# Patient Record
Sex: MALE | NOT HISPANIC OR LATINO | Employment: UNEMPLOYED | URBAN - METROPOLITAN AREA
[De-identification: names, ages, dates, MRNs, and addresses within clinical notes are randomized per-mention and may not be internally consistent; named-entity substitution may affect disease eponyms.]

---

## 2020-06-18 ENCOUNTER — OFFICE VISIT (OUTPATIENT)
Dept: FAMILY MEDICINE | Facility: CLINIC | Age: 25
End: 2020-06-18
Payer: COMMERCIAL

## 2020-06-18 VITALS
SYSTOLIC BLOOD PRESSURE: 119 MMHG | RESPIRATION RATE: 16 BRPM | OXYGEN SATURATION: 98 % | WEIGHT: 183 LBS | TEMPERATURE: 98.2 F | HEART RATE: 72 BPM | BODY MASS INDEX: 27.11 KG/M2 | DIASTOLIC BLOOD PRESSURE: 74 MMHG | HEIGHT: 69 IN

## 2020-06-18 DIAGNOSIS — Z00.00 ROUTINE HISTORY AND PHYSICAL EXAMINATION OF ADULT: Primary | ICD-10-CM

## 2020-06-18 DIAGNOSIS — Z20.2 POTENTIAL EXPOSURE TO STD: ICD-10-CM

## 2020-06-18 DIAGNOSIS — F19.10 SUBSTANCE ABUSE (H): ICD-10-CM

## 2020-06-18 DIAGNOSIS — M25.522 LEFT ELBOW PAIN: ICD-10-CM

## 2020-06-18 LAB
ANION GAP SERPL CALCULATED.3IONS-SCNC: 6 MMOL/L (ref 3–14)
BASOPHILS # BLD AUTO: 0.1 10E9/L (ref 0–0.2)
BASOPHILS NFR BLD AUTO: 1.2 %
BUN SERPL-MCNC: 12 MG/DL (ref 7–30)
CALCIUM SERPL-MCNC: 9.2 MG/DL (ref 8.5–10.1)
CHLORIDE SERPL-SCNC: 111 MMOL/L (ref 94–109)
CHOLEST SERPL-MCNC: 113 MG/DL
CO2 SERPL-SCNC: 24 MMOL/L (ref 20–32)
CREAT SERPL-MCNC: 0.83 MG/DL (ref 0.66–1.25)
DIFFERENTIAL METHOD BLD: NORMAL
EOSINOPHIL # BLD AUTO: 0.5 10E9/L (ref 0–0.7)
EOSINOPHIL NFR BLD AUTO: 9.6 %
ERYTHROCYTE [DISTWIDTH] IN BLOOD BY AUTOMATED COUNT: 12.6 % (ref 10–15)
GFR SERPL CREATININE-BSD FRML MDRD: >90 ML/MIN/{1.73_M2}
GLUCOSE SERPL-MCNC: 82 MG/DL (ref 70–99)
HCT VFR BLD AUTO: 42.3 % (ref 40–53)
HDLC SERPL-MCNC: 51 MG/DL
HGB BLD-MCNC: 13.4 G/DL (ref 13.3–17.7)
IMM GRANULOCYTES # BLD: 0 10E9/L (ref 0–0.4)
IMM GRANULOCYTES NFR BLD: 0.2 %
LDLC SERPL CALC-MCNC: 55 MG/DL
LYMPHOCYTES # BLD AUTO: 1.5 10E9/L (ref 0.8–5.3)
LYMPHOCYTES NFR BLD AUTO: 30.4 %
MCH RBC QN AUTO: 29.9 PG (ref 26.5–33)
MCHC RBC AUTO-ENTMCNC: 31.7 G/DL (ref 31.5–36.5)
MCV RBC AUTO: 94 FL (ref 78–100)
MONOCYTES # BLD AUTO: 0.9 10E9/L (ref 0–1.3)
MONOCYTES NFR BLD AUTO: 17.7 %
NEUTROPHILS # BLD AUTO: 2 10E9/L (ref 1.6–8.3)
NEUTROPHILS NFR BLD AUTO: 40.9 %
NONHDLC SERPL-MCNC: 62 MG/DL
NRBC # BLD AUTO: 0 10*3/UL
NRBC BLD AUTO-RTO: 0 /100
PLATELET # BLD AUTO: 261 10E9/L (ref 150–450)
POTASSIUM SERPL-SCNC: 4.1 MMOL/L (ref 3.4–5.3)
RBC # BLD AUTO: 4.48 10E12/L (ref 4.4–5.9)
SODIUM SERPL-SCNC: 141 MMOL/L (ref 133–144)
TRIGL SERPL-MCNC: 32 MG/DL
WBC # BLD AUTO: 4.8 10E9/L (ref 4–11)

## 2020-06-18 ASSESSMENT — ANXIETY QUESTIONNAIRES
IF YOU CHECKED OFF ANY PROBLEMS ON THIS QUESTIONNAIRE, HOW DIFFICULT HAVE THESE PROBLEMS MADE IT FOR YOU TO DO YOUR WORK, TAKE CARE OF THINGS AT HOME, OR GET ALONG WITH OTHER PEOPLE: NOT DIFFICULT AT ALL
1. FEELING NERVOUS, ANXIOUS, OR ON EDGE: MORE THAN HALF THE DAYS
3. WORRYING TOO MUCH ABOUT DIFFERENT THINGS: SEVERAL DAYS
6. BECOMING EASILY ANNOYED OR IRRITABLE: MORE THAN HALF THE DAYS
7. FEELING AFRAID AS IF SOMETHING AWFUL MIGHT HAPPEN: NOT AT ALL
GAD7 TOTAL SCORE: 8
5. BEING SO RESTLESS THAT IT IS HARD TO SIT STILL: SEVERAL DAYS
2. NOT BEING ABLE TO STOP OR CONTROL WORRYING: SEVERAL DAYS

## 2020-06-18 ASSESSMENT — PATIENT HEALTH QUESTIONNAIRE - PHQ9
SUM OF ALL RESPONSES TO PHQ QUESTIONS 1-9: 5
5. POOR APPETITE OR OVEREATING: SEVERAL DAYS

## 2020-06-18 ASSESSMENT — MIFFLIN-ST. JEOR: SCORE: 1813.63
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
## 2020-06-18 NOTE — NURSING NOTE
"24 year old  Chief Complaint   Patient presents with     Physical     Pt. presents to the clinic today from  [facility] for a physical exam.        Blood pressure 119/74, pulse 72, temperature 98.2  F (36.8  C), temperature source Oral, resp. rate 16, height 1.758 m (5' 9.2\"), weight 83 kg (183 lb), SpO2 98 %. Body mass index is 26.87 kg/m .  BP completed using cuff size:    There is no problem list on file for this patient.      Wt Readings from Last 2 Encounters:   06/18/20 83 kg (183 lb)     BP Readings from Last 3 Encounters:   06/18/20 119/74       No Known Allergies    No current outpatient medications on file.     No current facility-administered medications for this visit.        Social History     Tobacco Use     Smoking status: Current Every Day Smoker     Years: 9.00     Types: Cigarettes     Smokeless tobacco: Never Used   Substance Use Topics     Alcohol use: Not Currently     Drug use: Not Currently     Types: Marijuana, Opiates       Honoring Choices - Health Care Directive Guide offered to patient at time of visit.    Health Maintenance Due   Topic Date Due     PREVENTIVE CARE VISIT  1995     DTAP/TDAP/TD IMMUNIZATION (1 - Tdap) 06/19/2002     HPV IMMUNIZATION (1 - Male 2-dose series) 06/19/2006     HIV SCREENING  06/19/2010     PHQ-2  01/01/2020         There is no immunization history on file for this patient.    No results found for: PAP      No lab results found.    PHQ-2 ( 1999 Pfizer) 6/18/2020   Q1: Little interest or pleasure in doing things 1   Q2: Feeling down, depressed or hopeless 1   PHQ-2 Score 2       PHQ-9 SCORE 6/18/2020   PHQ-9 Total Score 5       LUZMA-7 SCORE 6/18/2020   Total Score 8       No flowsheet data found.        Regla Ahmadi CMA  June 18, 2020 8:22 AM    "

## 2020-06-19 LAB
C TRACH DNA SPEC QL NAA+PROBE: NEGATIVE
HCV AB SERPL QL IA: NONREACTIVE
HIV 1+2 AB+HIV1 P24 AG SERPL QL IA: NONREACTIVE
N GONORRHOEA DNA SPEC QL NAA+PROBE: NEGATIVE
SPECIMEN SOURCE: NORMAL
SPECIMEN SOURCE: NORMAL
T PALLIDUM AB SER QL: NONREACTIVE

## 2020-06-19 ASSESSMENT — ANXIETY QUESTIONNAIRES: GAD7 TOTAL SCORE: 8

## 2020-06-25 ENCOUNTER — VIRTUAL VISIT (OUTPATIENT)
Dept: FAMILY MEDICINE | Facility: CLINIC | Age: 25
End: 2020-06-25
Payer: COMMERCIAL

## 2020-06-25 DIAGNOSIS — Z09 FOLLOW UP: Primary | ICD-10-CM

## 2020-06-25 DIAGNOSIS — F51.02 ADJUSTMENT INSOMNIA: ICD-10-CM

## 2020-06-25 NOTE — PROGRESS NOTES
"Gabriela Sabillon is a 25 year old male who is being evaluated via a billable telephone visit.      The patient has been notified of following:     \"This telephone visit will be conducted via a call between you and your physician/provider. We have found that certain health care needs can be provided without the need for a physical exam.  This service lets us provide the care you need with a short phone conversation.  If a prescription is necessary we can send it directly to your pharmacy.  If lab work is needed we can place an order for that and you can then stop by our lab to have the test done at a later time.    Telephone visits are billed at different rates depending on your insurance coverage. During this emergency period, for some insurers they may be billed the same as an in-person visit.  Please reach out to your insurance provider with any questions.    If during the course of the call the physician/provider feels a telephone visit is not appropriate, you will not be charged for this service.\"    Patient has given verbal consent for Telephone visit?  Yes    What phone number would you like to be contacted at? [phone]    How would you like to obtain your AVS? Mail a copy    Subjective     Gabriela Sabillon is a 25 year old male who presents via phone visit today for the following health issues:    Sami is on the call to discuss his labs from his health maintenance exam.   He also states he is having trouble sleeping and believes it is associated with his anxiety.  He would like to wait for one more week to see if he can adjust to his surroundings and sleep better.    Review of Systems   CONSTITUTIONAL: NEGATIVE for fever, chills, change in weight  ENT/MOUTH: NEGATIVE for ear, mouth and throat problems  RESP: NEGATIVE for significant cough or SOB  CV: NEGATIVE for chest pain, palpitations or peripheral edema     Objective   Reported vitals:  There were no vitals taken for this visit.   PSYCH: Alert and oriented " times 3; coherent speech, normal   rate and volume, able to articulate logical thoughts, able   to abstract reason, no tangential thoughts, no hallucinations   or delusions  His affect is normal  RESP: No cough, no audible wheezing, able to talk in full sentences  Remainder of exam unable to be completed due to telephone visits    Labs reviewed in Epic        Assessment/Plan:  1. Follow up labs and PE exam      2. Adjustment insomnia        No follow-ups on file.      Phone call duration:  6 minutes    Komal Ramos NP

## 2020-08-13 ENCOUNTER — VIRTUAL VISIT (OUTPATIENT)
Dept: FAMILY MEDICINE | Facility: CLINIC | Age: 25
End: 2020-08-13
Payer: COMMERCIAL

## 2020-08-13 DIAGNOSIS — G47.00 PERSISTENT INSOMNIA: Primary | ICD-10-CM

## 2020-08-13 DIAGNOSIS — N50.811 TESTICULAR PAIN, RIGHT: ICD-10-CM

## 2020-08-13 RX ORDER — TRAZODONE HYDROCHLORIDE 50 MG/1
50 TABLET, FILM COATED ORAL
Qty: 30 TABLET | Refills: 1 | Status: SHIPPED | OUTPATIENT
Start: 2020-08-13 | End: 2023-07-09
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
## 2020-08-19 NOTE — TELEPHONE ENCOUNTER
MEDICAL RECORDS REQUEST   [facility]  Urology Clinic  [address] [place] [address]  PHONE: [phone]  Fax: [phone]        FUTURE VISIT INFORMATION                                                   RAYMON Engel: 1995 scheduled for future visit at [facility]    APPOINTMENT INFORMATION:    Date: 20 8:30AM    Provider:  Lakisha Lincoln PA-C    Reason for Visit/Diagnosis: Testicular pain     REFERRAL INFORMATION:    Referring provider: EMILIANO MILAN      Specialty: NP    Referring providers clinic:      Clinic contact number:  [phone]    RECORDS REQUESTED FOR VISIT                                                     NOTES  STATUS/DETAILS   OFFICE NOTE from referring provider  yes   OFFICE NOTE from other specialist  no   DISCHARGE SUMMARY from hospital  no   DISCHARGE REPORT from the ER  no   OPERATIVE REPORT  no   MEDICATION LIST  yes     PRE-VISIT CHECKLIST      Record collection complete Yes- Internal recs in epic    Appointment appropriately scheduled           (right time/right provider) Yes   MyChart activation If no, please explain: In process    Questionnaire complete If no, please explain: In process      Completed by: Minnie Allen

## 2020-08-25 ENCOUNTER — PRE VISIT (OUTPATIENT)
Dept: UROLOGY | Facility: CLINIC | Age: 25
End: 2020-08-25

## 2020-08-25 NOTE — TELEPHONE ENCOUNTER
Visit Type : Testicular pain     Records/Orders: in epic     Pt Contacted: no    At Rooming: phone

## 2020-09-01 ENCOUNTER — PRE VISIT (OUTPATIENT)
Dept: UROLOGY | Facility: CLINIC | Age: 25
End: 2020-09-01

## 2020-09-01 ENCOUNTER — VIRTUAL VISIT (OUTPATIENT)
Dept: UROLOGY | Facility: CLINIC | Age: 25
End: 2020-09-01
Attending: NURSE PRACTITIONER
Payer: COMMERCIAL

## 2020-09-01 DIAGNOSIS — N52.9 ERECTILE DYSFUNCTION, UNSPECIFIED ERECTILE DYSFUNCTION TYPE: ICD-10-CM

## 2020-09-01 DIAGNOSIS — N50.811 TESTICULAR PAIN, RIGHT: Primary | ICD-10-CM

## 2020-09-01 ASSESSMENT — PAIN SCALES - GENERAL: PAINLEVEL: NO PAIN (0)
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
## 2020-09-01 NOTE — PATIENT INSTRUCTIONS
UROLOGY CLINIC VISIT PATIENT INSTRUCTIONS    Schedule a scrotal ultrasound and then follow up phone visit to discuss results.     If you have any issues, questions or concerns in the meantime, do not hesitate to contact us at [phone] or via [facility].     It was a pleasure meeting with you today.  Thank you for allowing me and my team the privilege of caring for you today.  YOU are the reason we are here, and I truly hope we provided you with the excellent service you deserve.  Please let us know if there is anything else we can do for you so that we can be sure you are leaving completely satisfied with your care experience.
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
## 2020-09-03 ENCOUNTER — TELEPHONE (OUTPATIENT)
Dept: UROLOGY | Facility: CLINIC | Age: 25
End: 2020-09-03

## 2020-09-03 NOTE — TELEPHONE ENCOUNTER
Didn't leave a message. Voice mail was not patient.     *need to schedule ultrasound and virtual visit with Lakisha after

## 2023-01-26 ENCOUNTER — HOSPITAL ENCOUNTER (EMERGENCY)
Facility: CLINIC | Age: 28
Discharge: HOME | End: 2023-01-27
Attending: EMERGENCY MEDICINE | Admitting: EMERGENCY MEDICINE
Payer: COMMERCIAL

## 2023-01-26 DIAGNOSIS — F19.929 DRUG INTOXICATION WITH COMPLICATION (H): ICD-10-CM

## 2023-01-26 PROCEDURE — 99283 EMERGENCY DEPT VISIT LOW MDM: CPT | Performed by: EMERGENCY MEDICINE

## 2023-01-26 ASSESSMENT — ACTIVITIES OF DAILY LIVING (ADL)
ADLS_ACUITY_SCORE: 35
ADLS_ACUITY_SCORE: 35

## 2023-01-27 VITALS
BODY MASS INDEX: 24.51 KG/M2 | HEART RATE: 89 BPM | RESPIRATION RATE: 18 BRPM | SYSTOLIC BLOOD PRESSURE: 122 MMHG | HEIGHT: 71 IN | OXYGEN SATURATION: 99 % | TEMPERATURE: 98.5 F | DIASTOLIC BLOOD PRESSURE: 77 MMHG | WEIGHT: 175.1 LBS

## 2023-01-27 ASSESSMENT — ACTIVITIES OF DAILY LIVING (ADL)
ADLS_ACUITY_SCORE: 35

## 2023-01-27 NOTE — ED NOTES
"Patient ambulatory to ED, reports using \"all different drugs\" lately, unable to state what he is seeking from the hospital but states he wanted to make sure he wasn't dead. Pt is awake and alert, no respiratory distress or chest pain. Denies suicidal or homicidal thoughts.   "

## 2023-01-27 NOTE — ED NOTES
Pt awake, alert, ambulatroy. Pt expressing interest in residential treatment for drug use. Given information on substance abuse programs. Pt agreeable to plan and is going to phone once open.

## 2023-01-27 NOTE — ED NOTES
"     Emergency Department Patient Sign-out       Brief HPI:  This is a 27 year old male signed out to me by Dr. Sawant .  See initial ED Provider note for details of the presentation.        Significant Events prior to my assuming care: Patient with etoh intoxication and likely other drug use. Needs to sober. No SI initially. States he wants to talk to  , will re eval in morning.      Exam:   Patient Vitals for the past 24 hrs:   BP Temp Temp src Pulse Resp SpO2 Height Weight   01/26/23 2321 -- -- -- -- 18 -- -- --   01/26/23 1926 127/88 98.5  F (36.9  C) Oral 90 20 99 % 1.803 m (5' 11\") 79.4 kg (175 lb 1.6 oz)           ED RESULTS:   No results found for this visit on 01/26/23 (from the past 24 hour(s)).    ED MEDICATIONS:   Medications - No data to display      Impression:    ICD-10-CM    1. Drug intoxication with complication (H)  F19.929           Plan:    Patient sobered overnight. He has no acute medical complaints. He has no SI/HI or concern for his mental health. He does not want to speak to a behavioral . He does not want detox. He will be discharged with resources.       MD Andrea Patricio, Jimmy Moreno MD  01/27/23 0547    "

## 2023-01-27 NOTE — ED TRIAGE NOTES
Patient states the took xanxa with fetanyl, as well as drinking alcohol. Friends sent him in an uber to get help, unclear if he wants treatment.      Triage Assessment     Row Name 01/26/23 1919       Triage Assessment (Adult)    Airway WDL WDL       Respiratory WDL    Respiratory WDL WDL       Skin Circulation/Temperature WDL    Skin Circulation/Temperature WDL WDL       Cardiac WDL    Cardiac WDL WDL       Peripheral/Neurovascular WDL    Peripheral Neurovascular WDL WDL       Cognitive/Neuro/Behavioral WDL    Cognitive/Neuro/Behavioral WDL WDL
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
## 2023-01-27 NOTE — DISCHARGE INSTRUCTIONS
DISCHARGE RESOURCES:  -[facility] [phone] (detox), [phone] (outpatient & [facility])    -[facility] Recovery - self management for addiction recovery:  www.smartrecovery.org    -[facility] ~ [facility] Crisis Resource [facility] Support [facility]: 943.120.1684.  -[facility] [phone]   -Substance Abuse and Mental Health Services (www.samhsa.gov)  -Harm Reduction Coalition (www. Harmreduction.org)  -[place] Opioid Prevention Coalition: www.opioidcoalition.org  -Poison control 1-880.990.8018       Sober Support Group Information:  AA/NA & Sponsor/Support  -Alcoholics Anonymous (www.alcoholics-anonymous.org): for local information 24 hours/day  -AA Intergroup service office in [place] (http://www.aastpaul.org/) [phone]  -AA Intergroup service office in [facility]: [phone]. (http://www.aaminneaStackAdaptis.org/)  -Narcotics Anonymous (www.nam[place].org) (500) 787-8474   -Sober Fun Activities: www.soberDercetoactivities.Kidzillions.Whatâ€™s More Alive Than You/[facility]//[facility] Recovery Connection (MRC)  [facility] connects people seeking recovery to resources that help foster and sustain long-term recovery.  Whether you are seeking resources for treatment, transportation, housing, job training, education, health care or other pathways to recovery, [facility] is a great place to start.   Phone: 837.883.1703. www.[place].org (Great listing of all types of recovery and non-recovery related resources)

## 2023-05-08 ENCOUNTER — HEALTH MAINTENANCE LETTER (OUTPATIENT)
Age: 28
End: 2023-05-08

## 2023-07-09 ENCOUNTER — HOSPITAL ENCOUNTER (EMERGENCY)
Facility: CLINIC | Age: 28
Discharge: HOME | End: 2023-07-10
Attending: EMERGENCY MEDICINE | Admitting: EMERGENCY MEDICINE
Payer: COMMERCIAL

## 2023-07-09 DIAGNOSIS — S99.922A TOE INJURY, LEFT, INITIAL ENCOUNTER: ICD-10-CM

## 2023-07-09 DIAGNOSIS — T14.8XXA SKIN ABRASION: ICD-10-CM

## 2023-07-09 PROCEDURE — 99283 EMERGENCY DEPT VISIT LOW MDM: CPT | Performed by: EMERGENCY MEDICINE

## 2023-07-09 RX ORDER — DIPHENHYDRAMINE HYDROCHLORIDE 50 MG/ML
25 INJECTION INTRAMUSCULAR; INTRAVENOUS ONCE
Status: DISCONTINUED | OUTPATIENT
Start: 2023-07-09 | End: 2023-07-09

## 2023-07-09 ASSESSMENT — ACTIVITIES OF DAILY LIVING (ADL): ADLS_ACUITY_SCORE: 33

## 2023-07-10 VITALS
TEMPERATURE: 98.3 F | RESPIRATION RATE: 18 BRPM | SYSTOLIC BLOOD PRESSURE: 124 MMHG | HEART RATE: 94 BPM | OXYGEN SATURATION: 99 % | DIASTOLIC BLOOD PRESSURE: 79 MMHG
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
## 2023-07-10 NOTE — DISCHARGE INSTRUCTIONS
Keep the wounds clean and covered with antibiotic ointment.  Return to the emergency department or see your primary care doctor if you have increased redness from the wounds, thick drainage, if you have fever, any other concerns.
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
## 2023-07-10 NOTE — ED TRIAGE NOTES
Triage Assessment     Row Name 07/09/23 3605       Triage Assessment (Adult)    Airway WDL WDL       Respiratory WDL    Respiratory WDL WDL       Cardiac WDL    Cardiac WDL WDL       Peripheral/Neurovascular WDL    Peripheral Neurovascular WDL WDL       Cognitive/Neuro/Behavioral WDL    Cognitive/Neuro/Behavioral WDL WDL

## 2023-08-22 ENCOUNTER — OFFICE VISIT (OUTPATIENT)
Dept: FAMILY MEDICINE | Facility: CLINIC | Age: 28
End: 2023-08-22
Payer: COMMERCIAL

## 2023-08-22 VITALS
BODY MASS INDEX: 28.35 KG/M2 | DIASTOLIC BLOOD PRESSURE: 62 MMHG | WEIGHT: 198 LBS | OXYGEN SATURATION: 99 % | TEMPERATURE: 98.2 F | SYSTOLIC BLOOD PRESSURE: 120 MMHG | HEIGHT: 70 IN | RESPIRATION RATE: 28 BRPM | HEART RATE: 88 BPM

## 2023-08-22 DIAGNOSIS — F10.21 ALCOHOL DEPENDENCE IN REMISSION (H): ICD-10-CM

## 2023-08-22 DIAGNOSIS — F19.10 POLYSUBSTANCE ABUSE (H): Primary | ICD-10-CM

## 2023-08-22 PROBLEM — T50.901A ACCIDENTAL DRUG OVERDOSE: Status: ACTIVE | Noted: 2021-03-15

## 2023-08-22 PROBLEM — I86.1 RIGHT VARICOCELE: Status: ACTIVE | Noted: 2018-05-31

## 2023-08-22 PROBLEM — D22.9 ATYPICAL MOLE: Status: ACTIVE | Noted: 2018-05-22

## 2023-08-22 PROCEDURE — 99204 OFFICE O/P NEW MOD 45 MIN: CPT | Performed by: FAMILY MEDICINE

## 2023-08-22 RX ORDER — GABAPENTIN 100 MG/1
CAPSULE ORAL
COMMUNITY
Start: 2023-08-09 | End: 2023-08-22

## 2023-08-22 RX ORDER — QUETIAPINE FUMARATE 25 MG/1
TABLET, FILM COATED ORAL
COMMUNITY
Start: 2023-08-16 | End: 2023-08-22

## 2023-08-22 RX ORDER — GABAPENTIN 300 MG/1
300 CAPSULE ORAL 3 TIMES DAILY
Qty: 90 CAPSULE | Refills: 1 | Status: SHIPPED | OUTPATIENT
Start: 2023-08-22 | End: 2023-09-22

## 2023-08-22 RX ORDER — QUETIAPINE FUMARATE 25 MG/1
25 TABLET, FILM COATED ORAL 2 TIMES DAILY
Qty: 30 TABLET | Refills: 0 | Status: SHIPPED | OUTPATIENT
Start: 2023-08-22 | End: 2023-09-18

## 2023-08-22 NOTE — PATIENT INSTRUCTIONS
Good job with the sobriety journey! Keep doing your homework, take the medicine as we prescribed.   Please set up a telehealth appointment for follow up in one month.
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
## 2023-09-18 DIAGNOSIS — F19.10 POLYSUBSTANCE ABUSE (H): ICD-10-CM

## 2023-09-18 RX ORDER — QUETIAPINE FUMARATE 25 MG/1
25 TABLET, FILM COATED ORAL 2 TIMES DAILY
Qty: 30 TABLET | Refills: 0 | Status: SHIPPED | OUTPATIENT
Start: 2023-09-18 | End: 2023-09-22

## 2023-09-18 NOTE — TELEPHONE ENCOUNTER
Medication Question or Refill        What medication are you calling about (include dose and sig)?: Seroquil 25    Preferred Pharmacy:       [id number] - [place], [place] - [address]  [address] [address]  Phone: [phone] Fax: [phone]      Controlled Substance Agreement on file:   CSA -- Patient Level:    CSA: None found at the patient level.       Who prescribed the medication?: PCP    Do you need a refill? Yes    When did you use the medication last? today    Patient offered an appointment? Yes: only needs refill    Do you have any questions or concerns?  No      Could we send this information to you in [employer] or would you prefer to receive a phone call?:   Patient would like to be contacted via [employer]

## 2023-09-22 ENCOUNTER — VIRTUAL VISIT (OUTPATIENT)
Dept: FAMILY MEDICINE | Facility: CLINIC | Age: 28
End: 2023-09-22
Payer: COMMERCIAL

## 2023-09-22 DIAGNOSIS — F19.10 POLYSUBSTANCE ABUSE (H): Primary | ICD-10-CM

## 2023-09-22 DIAGNOSIS — Z13.9 ENCOUNTER FOR SCREENING INVOLVING SOCIAL DETERMINANTS OF HEALTH (SDOH): ICD-10-CM

## 2023-09-22 PROCEDURE — 99442 PR PHYSICIAN TELEPHONE EVALUATION 11-20 MIN: CPT | Mod: 93 | Performed by: FAMILY MEDICINE

## 2023-09-22 RX ORDER — HYDROXYZINE PAMOATE 50 MG/1
50 CAPSULE ORAL 3 TIMES DAILY PRN
Qty: 270 CAPSULE | Refills: 0 | Status: SHIPPED | OUTPATIENT
Start: 2023-09-22 | End: 2024-03-25

## 2023-09-22 RX ORDER — QUETIAPINE FUMARATE 100 MG/1
100-200 TABLET, FILM COATED ORAL 2 TIMES DAILY
Qty: 180 TABLET | Refills: 1 | Status: SHIPPED | OUTPATIENT
Start: 2023-09-22 | End: 2024-03-25

## 2023-09-22 NOTE — COMMUNITY RESOURCES LIST (ENGLISH)
09/22/2023   [facility]  N/A  For questions about this resource list or additional care needs, please contact your primary care clinic or care manager.  Phone: [phone]   Email: N/A   Address: [address] [place] [address]   Hours: N/A        Hotlines and Helplines       Hotline - Housing crisis  1  [facility] Distance: 0.59 miles      Phone/Virtual   [address] [place] [address]  Language: English  Hours: Mon - Sun Open 24 Hours   Phone: (339) 153-5470 Email: communications@[facility].org Website: https://oscs-[place].org/oursaviourshousing/     2  [facility] Distance: 1.36 miles      Phone/Virtual   [address] [place] [address]  Language: English  Hours: Mon - Sun Open 24 Hours   Phone: (863) 231-2252 Email: info@[facility].org Website: http://www.[facility].org          Housing       Coordinated Entry access point  3  [facility] [employer] Service of [place] ([facility] - Housing Services Distance: 0.42 miles      In-Person   [address] [place] [address]  Language: English  Hours: Mon - Fri 9:00 AM - 5:00 PM  Fees: Free   Phone: (504) 515-4266 Email: housing@[facility].org Website: http://www.[facility].org/housing     4  [facility] - Adult care home [facility] Distance: 1.5 miles      In-Person   [address] [place] [address]  Language: English  Hours: Mon - Sat 10:00 PM - 5:00 PM  Fees: Free   Phone: (400) 409-5460 Email: info@[facility].DVS Intelestream Website: http://www.[facility].org/     Drop-in center or day shelter  5  [facility] Distance: 0.82 miles      In-Person   [address] [place] [address]  Language: English  Hours: Mon - Fri 12:00 PM - 3:00 PM  Fees: Free   Phone: (378) 745-9548 Email: PROLOR BiotechcommunMVERSE@Autogrid.People Interactive ([place]) Website: http://AdGent Digital.org/     6  [facility] [place] [facility] [place] [facility] Distance: 0.98 miles      In-Person   [address] [address]  Language: English, Liberian, Telugu  Hours: Mon - Sat 7:00 AM - 3:00 PM  Fees: Free, Self Pay   Phone: (258) 193-4322 Email: info@[employer].AdzCentral Website: https://www.[employer].org/locations/opportunity-center/     Housing search assistance  7  [facility] ([facility]) Distance: 1 miles      In-Person   [address] [place] [address]  Language: English, Liberian, Telugu  Hours: Mon - Fri 8:30 AM - 4:30 PM  Fees: Free   Phone: (202) 575-9500 Email: camryn@[facility].org Website: http://www.Christ HospitalGlustermn.org/     8  [facility] Office of Multicultural Services Distance: 1.42 miles      Phone/Virtual   [address] [place] [address]  Language: American Sign Language, Latvian, Luxembourgish, English, Maltese, Kyrgyz, Oromo, Ghanaian, Liberian, Telugu, Swahili, St Helenian, Greek  Hours: Mon - Tue 9:00 AM - 4:00 PM , Wed 10:00 AM - 5:00 PM , Thu - Fri 9:00 AM - 4:00 PM  Fees: Free   Phone: (393) 190-9757 Email: oms@[employer]. Website: http://www.[facility]/residents/human-services/multi-cultural-services     Shelter for families  9  [facility] Distance: 19.51 miles      In-Person   [address] [place] [address]  Language: English  Hours: Mon - Fri 3:00 PM - 9:00 AM , Sat - Sun Open 24 Hours  Fees: Free   Phone: (358) 212-9989 Ext.1 Website: https://www.saintandMeme Apps.org/2020/07/03/emergency-family-shelter/     Shelter for individuals  10  [facility] Distance: 0.59 miles      In-Person   [address] [place] [address]  Language: English  Hours: Mon - Sun Open 24 Hours  Fees: Free   Phone: (213) 165-4354 Email: communications@oscs-[place].org Website: https://oscs-mn.org/oursaviourshousing/     11  [facility] Distance: 1.85 miles      In-Person   [address] [place] [address]  Language: English  Hours: Mon - Fri 4:00 PM - 9:00 AM  Fees: Free    Phone: (443) 756-9942 Email: yeni@[facility].GeniusMatcher.org Website: https://[facility].org/[facility] Community Hospitaler/          Important Numbers & Websites       Emergency Services   911  [facility] Services   311  Poison Control   (568) 824-9527  Suicide Prevention Lifeline   (715) 485-8493 (TALK)  Child Abuse Hotline   (904) 343-9198 (4-A-Child)  Sexual Assault Hotline   (922) 395-9178 (HOPE)  National Runaway Safeline   (324) 455-6142 (RUNAWAY)  All-Options Talkline   (300) 259-1211  Substance Abuse Referral   (508) 758-8188 (HELP)

## 2023-09-22 NOTE — COMMUNITY RESOURCES LIST (ENGLISH)
09/22/2023   [facility]  N/A  For questions about this resource list or additional care needs, please contact your primary care clinic or care manager.  Phone: [phone]   Email: N/A   Address: [address] [place] [address]   Hours: N/A        Hotlines and Helplines       Hotline - Housing crisis  1  [facility] Distance: 0.59 miles      Phone/Virtual   [address] [place] [address]  Language: English  Hours: Mon - Sun Open 24 Hours   Phone: (769) 537-5778 Email: communications@[facility].org Website: https://oscs-mn.org/oursaviourshousing/     2  [facility] Distance: 1.36 miles      Phone/Virtual   [address] [place] [address]  Language: English  Hours: Mon - Sun Open 24 Hours   Phone: (245) 404-3447 Email: info@[facility][1].org Website: http://www.[facility].org          Housing       Coordinated Entry access point  3  [facility] [employer] Service of [place] ([facility] - Housing Services Distance: 0.42 miles      In-Person   [address] [place] [address]  Language: English  Hours: Mon - Fri 9:00 AM - 5:00 PM  Fees: Free   Phone: (920) 476-1201 Email: housing@[facility].org Website: http://www.[facility].org/housing     4  [facility] - Adult senior care [facility] Distance: 1.5 miles      In-Person   [address] [place] [address]  Language: English  Hours: Mon - Sat 10:00 PM - 5:00 PM  Fees: Free   Phone: (685) 305-2335 Email: info@[facility].Corhythm Website: http://www.[facility].org/     Drop-in center or day shelter  5  [facility] Distance: 0.82 miles      In-Person   [address] [place] [address]  Language: English  Hours: Mon - Fri 12:00 PM - 3:00 PM  Fees: Free   Phone: (371) 257-5226 Email: dermSearchcommunValence Health@Clipabout.Haload Website: http://Chalkboard.org/     6  [facility] [place] [facility] [place] [facility] Distance: 0.98 miles      In-Person   [address], [place] [address]  Language: English, Luxembourger, Amharic  Hours: Mon - Sat 7:00 AM - 3:00 PM  Fees: Free, Self Pay   Phone: (503) 307-1575 Email: info@[employer].Oxyntix Website: https://www.[employer]awfish [employer].org/locations/opportunity-center/     Housing search assistance  7  [facility] [employer] [facility] ([facility]) Distance: 1 miles      In-Person   [address] [place] [address]  Language: English, Luxembourger, Amharic  Hours: Mon - Fri 8:30 AM - 4:30 PM  Fees: Free   Phone: (236) 765-2940 Email: camryn@[facility].org Website: http://www.Christ HospitalMedtric Biotechmn.org/     8  [facility] [place] Office of Multicultural Services Distance: 1.42 miles      Phone/Virtual   [address] [place] [address]  Language: American Sign Language, Syriac, Lao, English, Thai, Hungarian, Oromo, Canadian, Luxembourger, Amharic, Swahili, Moldovan, Irish  Hours: Mon - Tue 9:00 AM - 4:00 PM , Wed 10:00 AM - 5:00 PM , Thu - Fri 9:00 AM - 4:00 PM  Fees: Free   Phone: (200) 454-2990 Email: oms@[place]. Website: http://www.[facility]/residents/human-services/multi-cultural-services     Shelter for families  9  [facility] Distance: 19.51 miles      In-Person   [address] [place] [address]  Language: English  Hours: Mon - Fri 3:00 PM - 9:00 AM , Sat - Sun Open 24 Hours  Fees: Free   Phone: (575) 333-9042 Ext.1 Website: https://www.saintandViewpoints.org/2020/07/03/emergency-family-shelter/     Shelter for individuals  10  [facility] Distance: 0.59 miles      In-Person   [address] [place] [address]  Language: English  Hours: Mon - Sun Open 24 Hours  Fees: Free   Phone: (729) 151-8597 Email: communications@oscs-mn.org Website: https://oscs-mn.org/oursaviourshousing/     11  [facility] Distance: 1.85 miles      In-Person   [address] [place] [address]  Language: English  Hours: Mon - Fri 4:00 PM - 9:00 AM  Fees: Free    Phone: (337) 246-5948 Email: yeni@[facility].Arch Biopartners.org Website: https://[facility].House of the Good Samaritany.org/[facility]/Island Hospitaler/          Important Numbers & Websites       Emergency Services   911  [facility] Services   311  Poison Control   (847) 256-4688  Suicide Prevention Lifeline   (510) 765-4665 (TALK)  Child Abuse Hotline   (468) 308-5625 (4-A-Child)  Sexual Assault Hotline   (183) 278-6221 (HOPE)  National Runaway Safeline   (801) 331-1092 (RUNAWAY)  All-Options Talkline   (282) 459-6543  Substance Abuse Referral   (460) 997-1637 (HELP)

## 2023-09-22 NOTE — COMMUNITY RESOURCES LIST (ENGLISH)
09/22/2023   [facility]  N/A  For questions about this resource list or additional care needs, please contact your primary care clinic or care manager.  Phone: [phone]   Email: N/A   Address: [address] [place] [address]   Hours: N/A        Hotlines and Helplines       Hotline - Housing crisis  1  [facility] Distance: 0.59 miles      Phone/Virtual   [address] [place] [address]  Language: English  Hours: Mon - Sun Open 24 Hours   Phone: (131) 684-8830 Email: communications@[facility]-[place].org Website: https://oscs-mn.org/oursaviourshousing/     2  [facility] Distance: 1.36 miles      Phone/Virtual   [address] [place] [address]  Language: English  Hours: Mon - Sun Open 24 Hours   Phone: (367) 864-2322 Email: info@[facility].org Website: http://www.[facility].org          Housing       Coordinated Entry access point  3  [facility] [employer] Service of [place] ([facility] - Housing Services Distance: 0.42 miles      In-Person   [address] [place] [address]  Language: English  Hours: Mon - Fri 9:00 AM - 5:00 PM  Fees: Free   Phone: (434) 224-3246 Email: housing@[facility].org Website: http://www.[facility].org/housing     4  [facility] - Adult [facility] Distance: 1.5 miles      In-Person   [address] [place] [address]  Language: English  Hours: Mon - Sat 10:00 PM - 5:00 PM  Fees: Free   Phone: (882) 500-1806 Email: info@[facility].Qoture Website: http://www.[facility].org/     Drop-in center or day shelter  5  [facility] Distance: 0.82 miles      In-Person   [address] [place] [address]  Language: English  Hours: Mon - Fri 12:00 PM - 3:00 PM  Fees: Free   Phone: (689) 832-7710 Email: GreenWave RealitycommunSpunkmobile@GMG33.Wild Needle Website: http://Wabrikworks.org/     6  Yarsanism [facility] [place] and [place] [facility] Distance: 0.98 miles      In-Person   [address], [place] [address]  Language: English, Venezuelan, Arabic  Hours: Mon - Sat 7:00 AM - 3:00 PM  Fees: Free, Self Pay   Phone: (596) 744-9470 Email: info@ProHatch.Smarp Website: https://www.ProHatch.org/locations/opportunity-center/     Housing search assistance  7  [facility] ([facility]) Distance: 1 miles      In-Person   [address] [place] [address]  Language: English, Venezuelan, Arabic  Hours: Mon - Fri 8:30 AM - 4:30 PM  Fees: Free   Phone: (323) 694-5949 Email: camryn@[facility].org Website: http://www.[facility] HospitalCopperKeymn.org/     8  [facility] Office of Multicultural Services Distance: 1.42 miles      Phone/Virtual   [address] [place] [address]  Language: American Sign Language, Luxembourgish, Greek, English, Kyrgyz, Congolese, Oromo, Honduran, Venezuelan, Arabic, Swahili, Bhutanese, Yakut  Hours: Mon - Tue 9:00 AM - 4:00 PM , Wed 10:00 AM - 5:00 PM , Thu - Fri 9:00 AM - 4:00 PM  Fees: Free   Phone: (910) 128-5010 Email: oms@[place]. Website: http://www.[facility]/residents/human-services/multi-cultural-services     Shelter for families  9  [facility] Distance: 19.51 miles      In-Person   [address] [place] [address]  Language: English  Hours: Mon - Fri 3:00 PM - 9:00 AM , Sat - Sun Open 24 Hours  Fees: Free   Phone: (544) 918-6456 Ext.1 Website: https://www.saintandZadego.org/2020/07/03/emergency-family-shelter/     Shelter for individuals  10  [facility] Distance: 0.59 miles      In-Person   [address] [place] [address]  Language: English  Hours: Mon - Sun Open 24 Hours  Fees: Free   Phone: (416) 557-4171 Email: communications@oscs-[place].org Website: https://oscs-mn.org/oursaviourshousing/     11  [facility] Distance: 1.85 miles      In-Person   [address] [place] [address]  Language: English  Hours: Mon - Fri 4:00 PM - 9:00 AM  Fees: Free    Phone: (536) 131-8119 Email: yeni@[facility].Craft Dragon.org Website: https://[facility] Hospitaly.org/[facility]/Arbor Healther/          Important Numbers & Websites       Emergency Services   911  [facility] Services   311  Poison Control   (452) 604-6384  Suicide Prevention Lifeline   (277) 107-1853 (TALK)  Child Abuse Hotline   (196) 986-8977 (4-A-Child)  Sexual Assault Hotline   (599) 621-3268 (HOPE)  National Runaway Safeline   (399) 651-6947 (RUNAWAY)  All-Options Talkline   (738) 164-5620  Substance Abuse Referral   (909) 107-9187 (HELP)

## 2023-09-22 NOTE — COMMUNITY RESOURCES LIST (ENGLISH)
09/22/2023   [facility]  N/A  For questions about this resource list or additional care needs, please contact your primary care clinic or care manager.  Phone: [phone]   Email: N/A   Address: [address] [place] [address]   Hours: N/A        Hotlines and Helplines       Hotline - Housing crisis  1  [facility] Distance: 0.59 miles      Phone/Virtual   [address] [place] [address]  Language: English  Hours: Mon - Sun Open 24 Hours   Phone: (796) 443-9061 Email: communications@[facility]mn.org Website: https://oscs-mn.org/oursaviourshousing/     2  [facility] Distance: 1.36 miles      Phone/Virtual   [address] [place] [address]  Language: English  Hours: Mon - Sun Open 24 Hours   Phone: (165) 764-4308 Email: info@[facility].org Website: http://www.[facility].org          Housing       Coordinated Entry access point  3  [facility] Tequila [employer] Service of [place] ([facility] - Housing Services Distance: 0.42 miles      In-Person   [address] [place] [address]  Language: English  Hours: Mon - Fri 9:00 AM - 5:00 PM  Fees: Free   Phone: (301) 578-2853 Email: housing@[facility].org Website: http://www.[facility].org/housing     4  [facility] - Adult custodial [facility] Distance: 1.5 miles      In-Person   [address] [place] [address]  Language: English  Hours: Mon - Sat 10:00 PM - 5:00 PM  Fees: Free   Phone: (589) 234-5296 Email: info@[facility].Dresden Silicon Website: http://www.[facility]aintNorthern [facility].org/     Drop-in center or day shelter  5  [facility] Distance: 0.82 miles      In-Person   [address] [place] [address]  Language: English  Hours: Mon - Fri 12:00 PM - 3:00 PM  Fees: Free   Phone: (506) 308-1909 Email: Foundation for Community PartnershipscommunePACT Network@Pond [employer]fuels.Jirafe Website: http://ModaMi.org/     6  Gnosticist Charities of [place] and [place] [facility] Distance: 0.98 miles      In-Person   [address], [place] [address]  Language: English, Tajik, Kinyarwanda  Hours: Mon - Sat 7:00 AM - 3:00 PM  Fees: Free, Self Pay   Phone: (244) 514-8236 Email: info@CoinKeeper.ClusterSeven Website: https://www.CoinKeeper.org/locations/opportunity-center/     Housing search assistance  7  [facility] ([facility]) Distance: 1 miles      In-Person   [address] [place] [address]  Language: English, Tajik, Kinyarwanda  Hours: Mon - Fri 8:30 AM - 4:30 PM  Fees: Free   Phone: (203) 135-7422 Email: camryn@[facility].org Website: http://www.[facility] CenterMoovwebmn.org/     8  [facility] Office of Multicultural Services Distance: 1.42 miles      Phone/Virtual   [address] [place] [address]  Language: American Sign Language, Kinyarwanda, Hungarian, English, Spanish, Hong Konger, Oromo, Italian, Tajik, Kinyarwanda, Swahili, Malaysian, Luxembourgish  Hours: Mon - Tue 9:00 AM - 4:00 PM , Wed 10:00 AM - 5:00 PM , Thu - Fri 9:00 AM - 4:00 PM  Fees: Free   Phone: (117) 410-9155 Email: oms@[place]. Website: http://www.[facility]/residents/human-services/multi-cultural-services     Shelter for families  9  [facility] Distance: 19.51 miles      In-Person   [address] [place] [address]  Language: English  Hours: Mon - Fri 3:00 PM - 9:00 AM , Sat - Sun Open 24 Hours  Fees: Free   Phone: (664) 936-1019 Ext.1 Website: https://www.saintandBlue Bus Tees.org/2020/07/03/emergency-family-shelter/     Shelter for individuals  10  [facility] Distance: 0.59 miles      In-Person   [address] [place] [address]  Language: English  Hours: Mon - Sun Open 24 Hours  Fees: Free   Phone: (763) 675-3313 Email: communications@oscs-[place].org Website: https://oscs-mn.org/oursaviourshousing/     11  [facility] Distance: 1.85 miles      In-Person   [address] [place] [address]  Language: English  Hours: Mon - Fri 4:00 PM - 9:00 AM  Fees: Free    Phone: (959) 307-9595 Email: yeni@[facility].LiveQoS.org Website: https://[facility] Hospitaly.org/[facility]/PeaceHealther/          Important Numbers & Websites       Emergency Services   911  [facility] Services   311  Poison Control   (326) 737-5104  Suicide Prevention Lifeline   (597) 165-6768 (TALK)  Child Abuse Hotline   (176) 571-3419 (4-A-Child)  Sexual Assault Hotline   (185) 670-2046 (HOPE)  National Runaway Safeline   (685) 271-4726 (RUNAWAY)  All-Options Talkline   (436) 512-7183  Substance Abuse Referral   (356) 418-2795 (HELP)

## 2023-09-22 NOTE — PROGRESS NOTES
Gabriela is a 28 year old who is being evaluated via a billable telephone visit.      What phone number would you like to be contacted at? [phone]  How would you like to obtain your AVS? Dg  Distant Location (provider location):  On-site    Assessment & Plan     Polysubstance abuse (H)  Chronic, patient in rehabilitation service right now. Discussed that he would need increased dose to assist with mood changes, anxiety, and substance cravings. Increased dose as below.   - REVIEW OF HEALTH MAINTENANCE PROTOCOL ORDERS  - QUEtiapine (SEROQUEL) 100 MG tablet  Dispense: 180 tablet; Refill: 1  - hydrOXYzine (VISTARIL) 50 MG capsule  Dispense: 270 capsule; Refill: 0    Encounter for screening involving social determinants of health (SDoH)  - Primary Care - Care Coordination Referral      Prescription drug management  I spent a total of 12 minutes on the day of the visit.   Time spent by me doing chart review, history and exam, documentation and further activities per the note       See Patient Instructions    DO JOEL VILA [facility]   Gabriela is a 28 year old, presenting for the following health issues:  Follow Up (Polysubstance abuse)      9/22/2023    10:05 AM   Additional Questions   Roomed by Tari   Accompanied by N/A       History of Present Illness       Reason for visit:  Polysubstance abuse    He eats 2-3 servings of fruits and vegetables daily.He consumes 5 sweetened beverage(s) daily.He exercises with enough effort to increase his heart rate 60 or more minutes per day.  He exercises with enough effort to increase his heart rate 4 days per week.   He is taking medications regularly.     Polysubstance Abuse/ Alcohol Abuse  Patient is currently at his rehabilitation facility.   Patient would like to try an increase in his quetiapine as in the past he's been on a 50 mg dose. In the past he's taken both hydroxyzine and quetiapine 100 mg in the past.  He's been reported more irritable lately, and would like to increase both medications.     He denies any improvement with the gabapentin, he denies side effects as well.     Review of Systems   Constitutional, HEENT, cardiovascular, pulmonary, gi and gu systems are negative, except as otherwise noted.      Objective    Vitals - Patient Reported  Weight (Patient Reported): 87.5 kg (193 lb)  Pain Score: No Pain (0)        Physical Exam   healthy, alert, and no distress  PSYCH: Alert and oriented times 3; coherent speech, normal   rate and volume, able to articulate logical thoughts, able   to abstract reason, no tangential thoughts, no hallucinations   or delusions  His affect is normal  RESP: No cough, no audible wheezing, able to talk in full sentences  Remainder of exam unable to be completed due to telephone visits          Phone call duration: 11 minutes

## 2023-09-22 NOTE — PROGRESS NOTES
"Gabriela is a 28 year old who is being evaluated via a billable video visit.      How would you like to obtain your AVS? {AVS Preference:451509}  If the video visit is dropped, the invitation should be resent by: {video visit invitation (Optional) :303461}  Will anyone else be joining your video visit? {:854227}  {If patient encounters technical issues they should call [phone] :930888}        {PROVIDER CHARTING PREFERENCE:591502}    Subjective   Gabriela is a 28 year old, presenting for the following health issues:  Follow Up        9/22/2023    10:05 AM   Additional Questions   Roomed by Ross SINGH   Accompanied by N/A       HPI     {MA/LPN/RN Pre-Provider Visit Orders- hCG/UA/Strep (Optional):883550}  {SUPERLIST (Optional):400151}  {additonal problems for provider to add (Optional):010390}      Review of Systems   {ROS COMP (Optional):198816}      Objective           Vitals:  No vitals were obtained today due to virtual visit.    Physical Exam   {video visit exam brief selected:307417}    {Diagnostic Test Results (Optional):045518}    {AMBULATORY ATTESTATION (Optional):782759}        Video-Visit Details    Type of service:  Video Visit   Video Start Time: {video visit start/end time for provider to select:405331}  Video End Time:{video visit start/end time for provider to select:573507}    Originating Location (pt. Location): {video visit patient location:480972::\"Home\"}  {PROVIDER LOCATION On-site should be selected for visits conducted from your clinic location or adjoining [facility] hospital, academic office, or other nearby [facility] building. Off-site should be selected for all other provider locations, including home:281036}  Distant Location (provider location):  {virtual location provider:274620}  Platform used for Video Visit: {Virtual Visit Platforms:134411::\"[employer]\"}      "

## 2023-09-26 ENCOUNTER — PATIENT OUTREACH (OUTPATIENT)
Dept: CARE COORDINATION | Facility: CLINIC | Age: 28
End: 2023-09-26
Payer: COMMERCIAL

## 2023-09-26 NOTE — PROGRESS NOTES
Clinic Care Coordination Contact  Community Health Worker Initial Outreach    CHW Initial Information Gathering:  Referral Source: PCP  Preferred Urgent Care: [facility] [place], [phone]  Current living arrangement:: I live in a private home  Community Resources: Acute Rehab  Informal Support system:: Friends, Family  No PCP office visit in Past Year: No  Transportation means:: Regular car  CHW Additional Questions  If ED/Hospital discharge, follow-up appointment scheduled as recommended?: N/A  Medication changes made following ED/Hospital discharge?: N/A  MyChart active?: Yes  Patient sent Social Determinants of Health questionnaire?: Yes    Patient accepts CC: Yes. Patient scheduled for assessment with CC RN on 9/27/2023 at 9:00am. Patient noted desire to discuss CC referral.  Patient has questions on his medication instructions and would like to have them reworded for PRN.    Order Questions    Question Answer   Reason for Referral: SDOH Concern   Clinical Staff have discussed the Care Coordination Referral with the patient and/or caregiver: Yes     Aimee BROCK  Community Health Worker  [facility] Care Coordination  [facility] CenterStella [email or web address]  Office: [phone]

## 2023-09-27 ENCOUNTER — PATIENT OUTREACH (OUTPATIENT)
Dept: NURSING | Facility: CLINIC | Age: 28
End: 2023-09-27
Payer: COMMERCIAL

## 2023-09-27 ENCOUNTER — TELEPHONE (OUTPATIENT)
Dept: CARE COORDINATION | Facility: CLINIC | Age: 28
End: 2023-09-27

## 2023-09-27 NOTE — TELEPHONE ENCOUNTER
Casey Miles,     I spoke with patient this morning. He stated he would like his Quetiapine and Hydroxyzine prescriptions to say as needed (I see the hydroxyzine already states this). He stated the current orders the nurse [facility] has them as scheduled. It you are ok with the changes, I will contact the RN there and fax them the new orders. Please advise.     Thanks,     Dave Myhre,

## 2023-09-27 NOTE — PROGRESS NOTES
Clinic Care Coordination Contact  CCC RN spoke with patient this morning to discuss enrollment in Care Coordination. Patient stated he was currently residing at [facility] for inpatient CD treatment. He stated he was not interested in enrolling at this time related being in treatment. He did state he was would like his Quetiapine and Hydroxyzine changes to PRN instead of scheduled. He said he would then like the new orders sent to the RN at [facility]. Writer forwarded this request to his PCP. No other needs or concerns.

## 2023-09-28 NOTE — TELEPHONE ENCOUNTER
Casey Miles,     I spoke with Jamil staff member at the treatment facility. He reported patient is taking 100 mg of Quetiapine at bedtime only. Should he be taking twice daily, or should the order be changed to 1 to 2 tablets at bedtime and 1 to 2 tablets as needs during the day? Current order states 1 to 2 tablets twice daily. Just trying to make it clear for the staff at the treatment facility. Please advise.     Thanks,     Dave Myhre, RN  CCC RN

## 2023-10-03 ENCOUNTER — TELEPHONE (OUTPATIENT)
Dept: FAMILY MEDICINE | Facility: CLINIC | Age: 28
End: 2023-10-03
Payer: COMMERCIAL

## 2023-10-03 DIAGNOSIS — F10.21 ALCOHOL DEPENDENCE IN REMISSION (H): Primary | ICD-10-CM

## 2023-10-03 NOTE — TELEPHONE ENCOUNTER
Could you please update to order to reflect the changes. I tried, but I could not. I will fax to treatment facility when completed.    Thank you,     Dave Myhre, RN   CCC RN

## 2023-10-03 NOTE — TELEPHONE ENCOUNTER
gabapentin (NEURONTIN) 300 MG capsule (Discontinued) 90 capsule 1 8/22/2023 9/22/2023 No   Sig - Route: Take 1 capsule (300 mg) by mouth 3 times daily - Oral   Sent to pharmacy as: Gabapentin 300 MG Oral Capsule (NEURONTIN)   Class: E-Prescribe   Reason for Discontinue: Therapy completed (No AVS)   Order: [id number]   E-Prescribing Status: Receipt confirmed by pharmacy (8/22/2023 11:27 AM CDT)   E-Cancel Status: Request approved by pharmacy (9/22/2023 11:02 AM CDT)       E-Cancel Status Note: Some or all of Rx dispensed; Last fill:08/24/23       Patient calling to get a medication refill on medication(s) attached      Patient looking ASAP

## 2023-10-04 RX ORDER — GABAPENTIN 300 MG/1
300 CAPSULE ORAL 3 TIMES DAILY
Qty: 90 CAPSULE | Refills: 0 | Status: SHIPPED | OUTPATIENT
Start: 2023-10-04 | End: 2023-11-05

## 2023-10-06 NOTE — TELEPHONE ENCOUNTER
Spoke with patient regarding refill. He stated it was a miscommunication and he did  medication. I did not get to finish message and patient ended the call.

## 2023-11-05 DIAGNOSIS — F10.21 ALCOHOL DEPENDENCE IN REMISSION (H): ICD-10-CM

## 2023-11-06 RX ORDER — GABAPENTIN 300 MG/1
300 CAPSULE ORAL 3 TIMES DAILY
Qty: 90 CAPSULE | Refills: 0 | Status: SHIPPED | OUTPATIENT
Start: 2023-11-06 | End: 2024-03-25

## 2024-03-25 DIAGNOSIS — F19.10 POLYSUBSTANCE ABUSE (H): ICD-10-CM

## 2024-03-25 DIAGNOSIS — F10.21 ALCOHOL DEPENDENCE IN REMISSION (H): ICD-10-CM

## 2024-03-25 RX ORDER — HYDROXYZINE PAMOATE 50 MG/1
50 CAPSULE ORAL 3 TIMES DAILY PRN
Qty: 270 CAPSULE | Refills: 0 | Status: SHIPPED | OUTPATIENT
Start: 2024-03-25

## 2024-03-25 RX ORDER — QUETIAPINE FUMARATE 100 MG/1
100-200 TABLET, FILM COATED ORAL 2 TIMES DAILY
Qty: 180 TABLET | Refills: 1 | Status: SHIPPED | OUTPATIENT
Start: 2024-03-25

## 2024-03-25 RX ORDER — GABAPENTIN 300 MG/1
300 CAPSULE ORAL 3 TIMES DAILY
Qty: 90 CAPSULE | Refills: 0 | Status: SHIPPED | OUTPATIENT
Start: 2024-03-25

## 2024-07-14 ENCOUNTER — HEALTH MAINTENANCE LETTER (OUTPATIENT)
Age: 29
End: 2024-07-14

## 2024-11-18 DIAGNOSIS — F19.10 POLYSUBSTANCE ABUSE (H): ICD-10-CM

## 2024-11-19 RX ORDER — QUETIAPINE FUMARATE 100 MG/1
100-200 TABLET, FILM COATED ORAL 2 TIMES DAILY
Qty: 90 TABLET | Refills: 0 | Status: SHIPPED | OUTPATIENT
Start: 2024-11-19

## 2024-11-19 RX ORDER — HYDROXYZINE PAMOATE 50 MG/1
50 CAPSULE ORAL 3 TIMES DAILY PRN
Qty: 90 CAPSULE | Refills: 0 | Status: SHIPPED | OUTPATIENT
Start: 2024-11-19

## 2025-07-19 ENCOUNTER — HEALTH MAINTENANCE LETTER (OUTPATIENT)
Age: 30
End: 2025-07-19